# Patient Record
Sex: FEMALE | Race: WHITE | NOT HISPANIC OR LATINO | Employment: FULL TIME | URBAN - METROPOLITAN AREA
[De-identification: names, ages, dates, MRNs, and addresses within clinical notes are randomized per-mention and may not be internally consistent; named-entity substitution may affect disease eponyms.]

---

## 2024-02-14 ENCOUNTER — HOSPITAL ENCOUNTER (OUTPATIENT)
Dept: RADIOLOGY | Facility: CLINIC | Age: 59
Discharge: HOME | End: 2024-02-14
Payer: COMMERCIAL

## 2024-02-14 DIAGNOSIS — E78.2 MIXED HYPERLIPIDEMIA: ICD-10-CM

## 2024-02-14 PROCEDURE — 75571 CT HRT W/O DYE W/CA TEST: CPT

## 2024-09-04 ENCOUNTER — OFFICE VISIT (OUTPATIENT)
Dept: CARDIOLOGY | Facility: CLINIC | Age: 59
End: 2024-09-04
Payer: COMMERCIAL

## 2024-09-04 VITALS
HEIGHT: 60 IN | WEIGHT: 161 LBS | DIASTOLIC BLOOD PRESSURE: 82 MMHG | OXYGEN SATURATION: 97 % | SYSTOLIC BLOOD PRESSURE: 128 MMHG | BODY MASS INDEX: 31.61 KG/M2 | HEART RATE: 99 BPM

## 2024-09-04 DIAGNOSIS — R94.31 ABNORMAL ECG DURING EXERCISE STRESS TEST: Primary | ICD-10-CM

## 2024-09-04 DIAGNOSIS — E78.2 MIXED HYPERLIPIDEMIA: ICD-10-CM

## 2024-09-04 DIAGNOSIS — R93.1 ABNORMAL HEART SCORE CT: ICD-10-CM

## 2024-09-04 PROCEDURE — 93010 ELECTROCARDIOGRAM REPORT: CPT | Performed by: STUDENT IN AN ORGANIZED HEALTH CARE EDUCATION/TRAINING PROGRAM

## 2024-09-04 PROCEDURE — 1036F TOBACCO NON-USER: CPT

## 2024-09-04 PROCEDURE — 99203 OFFICE O/P NEW LOW 30 MIN: CPT

## 2024-09-04 PROCEDURE — 3008F BODY MASS INDEX DOCD: CPT

## 2024-09-04 PROCEDURE — 93005 ELECTROCARDIOGRAM TRACING: CPT

## 2024-09-04 PROCEDURE — 99213 OFFICE O/P EST LOW 20 MIN: CPT

## 2024-09-04 RX ORDER — BRIMONIDINE 5 MG/G
GEL TOPICAL
COMMUNITY
Start: 2024-06-13 | End: 2025-06-13

## 2024-09-04 RX ORDER — LACTOBACILLUS COMBINATION NO.4 3B CELL
CAPSULE ORAL
COMMUNITY

## 2024-09-04 RX ORDER — BUDESONIDE AND FORMOTEROL FUMARATE DIHYDRATE 80; 4.5 UG/1; UG/1
2 AEROSOL RESPIRATORY (INHALATION)
COMMUNITY
Start: 2024-04-25

## 2024-09-04 RX ORDER — SERTRALINE HYDROCHLORIDE 50 MG/1
TABLET, FILM COATED ORAL EVERY 24 HOURS
COMMUNITY

## 2024-09-04 RX ORDER — ATORVASTATIN CALCIUM 40 MG/1
40 TABLET, FILM COATED ORAL DAILY
COMMUNITY
Start: 2024-08-01

## 2024-09-04 RX ORDER — SULFACETAMIDE SODIUM, SULFUR 100; 50 MG/G; MG/G
EMULSION TOPICAL
COMMUNITY
Start: 2023-10-11 | End: 2024-10-08

## 2024-09-04 RX ORDER — ACETAMINOPHEN 160 MG
10 TABLET,CHEWABLE ORAL AS NEEDED
COMMUNITY

## 2024-09-04 RX ORDER — BUPROPION HYDROCHLORIDE 150 MG/1
150 TABLET ORAL EVERY MORNING
COMMUNITY
Start: 2023-08-14

## 2024-09-04 ASSESSMENT — ENCOUNTER SYMPTOMS: DYSPNEA ON EXERTION: 1

## 2024-09-04 NOTE — PROGRESS NOTES
Referred by Dr. 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     She reports that she has not been as active as she would like to be . Her biggest complaint is ALONZO with activity . We discussed her lipid panel that she had done at OSH. Patient denies chest pain and angina.  Pt denies orthopnea, and paroxysmal nocturnal dyspnea.  Pt denies worsening lower extremity edema.  Pt denies palpitations or syncope.  No recent falls.  No fever or chills.  No cough.  No change in bowel or bladder habits.  No sick contacts.  No recent travel.    Review of Systems   Cardiovascular:  Positive for dyspnea on exertion.   All other systems reviewed and are negative.    Past Medical History:  She has no past medical history on file.    Past Surgical History:  She has no past surgical history on file.      Social History:  Social drinking, denies smoking     Family History:  Paternal grandfather MI in 40's. Maternal grandfather angina.      Allergies:  Patient has no allergy information on record.    Outpatient Medications:  No current outpatient medications     Last Recorded Vitals:  There were no vitals filed for this visit.    Physical Exam  Constitutional:       Appearance: Normal appearance.   Neck:      Vascular: No carotid bruit.   Cardiovascular:      Rate and Rhythm: Normal rate and regular rhythm.      Heart sounds: No murmur heard.  Pulmonary:      Effort: Pulmonary effort is normal.      Breath sounds: Normal breath sounds.   Abdominal:      Palpations: Abdomen is soft.   Skin:     General: Skin is warm.   Neurological:      Mental Status: She is alert and oriented to person, place, and time.   Psychiatric:         Mood and Affect: Mood normal.  "      Last Labs:  CBC -  No results found for: \"WBC\", \"HGB\", \"HCT\", \"MCV\", \"PLT\"    CMP -  No results found for: \"CALCIUM\", \"PHOS\", \"PROT\", \"ALBUMIN\", \"AST\", \"ALT\", \"ALKPHOS\", \"BILITOT\"    LIPID PANEL -   No results found for: \"CHOL\", \"TRIG\", \"HDL\", \"CHHDL\", \"LDLF\", \"VLDL\", \"NHDL\"    RENAL FUNCTION PANEL -   No results found for: \"GLUCOSE\", \"NA\", \"K\", \"CL\", \"CO2\", \"ANIONGAP\", \"BUN\", \"CREATININE\", \"GFRMALE\", \"CALCIUM\", \"PHOS\", \"ALBUMIN\"     No results found for: \"BNP\", \"HGBA1C\"    Last Cardiology Tests:  ECG:  NSR     CT cardiac scoring wo IV contrast 02/14/2024  FINDINGS:  The score and distribution of calcium in the coronary arteries is as  follows:      LM 0,  .69,  LCx 40.18,  .48,      Total 507.35      The visualized mid/lower ascending thoracic aorta measures 3.5 cm in  diameter. The heart is normal in size. No pericardial effusion is  present.      No gross evidence of mediastinal or hilar lymphadenopathy or masses  is identified. The visualized segments of the lungs are normally  expanded.      The visualized subdiaphragmatic structures appear intact.Scoliosis.      IMPRESSION:  1. Coronary artery calcium score of 507*.  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 I personally reviewed the patients recent labs, medications, orders, EKGs, and pertinent cardiac imaging/ echocardiography and ischemic evaluations including stress testing.    She reports that she has not been as active as she would like to be . Her biggest complaint is ALONZO with activity . We discussed her lipid panel that she had done at OSH. Today she is normotensive . LDL in the 200's approximately 3 months ago prior to starting high intensity statin and aspirin 81 mg daily. Given her risk factors and recent " borderline abnormal stress test without imaging and cardiac CT score as well as symptoms will have her obtain a Nuclear Exercise Stress Test.    Patient to obtain fasting Lipid Panel and TSH with reflexive T4.     Follow up in 2-3 weeks virtually to discuss results.     Gemma Salazar, APRN-CNP

## 2024-09-04 NOTE — PATIENT INSTRUCTIONS
Nory,    Nuclear Exercise Stress Test. May take meds sips of water, avoid caffeine 12 hours before testing. [phone]    Please have TSH and Fasting lipid panel drawn    Follow up with a virtual visit 2 1/2    Gemma WONG-ТАТЬЯНА

## 2024-09-07 LAB
ATRIAL RATE: 85 BPM
P AXIS: 57 DEGREES
P OFFSET: 199 MS
P ONSET: 146 MS
PR INTERVAL: 138 MS
Q ONSET: 215 MS
QRS COUNT: 14 BEATS
QRS DURATION: 98 MS
QT INTERVAL: 388 MS
QTC CALCULATION(BAZETT): 461 MS
QTC FREDERICIA: 435 MS
R AXIS: 15 DEGREES
T AXIS: 66 DEGREES
T OFFSET: 409 MS
VENTRICULAR RATE: 85 BPM

## 2024-09-20 ENCOUNTER — HOSPITAL ENCOUNTER (OUTPATIENT)
Dept: RADIOLOGY | Facility: HOSPITAL | Age: 59
Discharge: HOME | End: 2024-09-20
Payer: COMMERCIAL

## 2024-09-20 ENCOUNTER — HOSPITAL ENCOUNTER (OUTPATIENT)
Dept: CARDIOLOGY | Facility: HOSPITAL | Age: 59
Discharge: HOME | End: 2024-09-20
Payer: COMMERCIAL

## 2024-09-20 DIAGNOSIS — R93.1 ABNORMAL HEART SCORE CT: ICD-10-CM

## 2024-09-20 DIAGNOSIS — R94.31 ABNORMAL ECG DURING EXERCISE STRESS TEST: ICD-10-CM

## 2024-09-20 PROCEDURE — A9502 TC99M TETROFOSMIN: HCPCS

## 2024-09-20 PROCEDURE — 3430000001 HC RX 343 DIAGNOSTIC RADIOPHARMACEUTICALS

## 2024-09-20 PROCEDURE — 93017 CV STRESS TEST TRACING ONLY: CPT

## 2024-09-20 PROCEDURE — 78452 HT MUSCLE IMAGE SPECT MULT: CPT

## 2024-09-20 PROCEDURE — 93016 CV STRESS TEST SUPVJ ONLY: CPT | Performed by: STUDENT IN AN ORGANIZED HEALTH CARE EDUCATION/TRAINING PROGRAM

## 2024-09-20 PROCEDURE — 93018 CV STRESS TEST I&R ONLY: CPT | Performed by: STUDENT IN AN ORGANIZED HEALTH CARE EDUCATION/TRAINING PROGRAM

## 2024-09-24 ENCOUNTER — TELEMEDICINE (OUTPATIENT)
Dept: CARDIOLOGY | Facility: CLINIC | Age: 59
End: 2024-09-24
Payer: COMMERCIAL

## 2024-09-24 DIAGNOSIS — R93.1 ABNORMAL HEART SCORE CT: ICD-10-CM

## 2024-09-24 DIAGNOSIS — R94.31 ABNORMAL ECG DURING EXERCISE STRESS TEST: ICD-10-CM

## 2024-09-24 DIAGNOSIS — E78.2 MIXED HYPERLIPIDEMIA: ICD-10-CM

## 2024-09-24 DIAGNOSIS — I25.10 CORONARY ARTERY DISEASE INVOLVING NATIVE HEART WITHOUT ANGINA PECTORIS, UNSPECIFIED VESSEL OR LESION TYPE: Primary | ICD-10-CM

## 2024-09-24 PROCEDURE — 99214 OFFICE O/P EST MOD 30 MIN: CPT

## 2024-09-24 NOTE — PROGRESS NOTES
Documentation:   Mode: Video  Consent: This visit was initiated by the patient. Audio and visual communication was utilized in real-time. I confirmed understanding of risks and benefits of telehealth visits and obtained consent to proceed with the telemedicine visit.  Location of Patient: Work  Time-Based Billing Justifications:   Charting in Epic  Patient visit (including performing a medically appropriate exam)  Obtaining history (or reviewing separately obtained history)  Counseling/educating the patient/family/caregiver  Ordering/interpreting (medications, tests, procedures)  Reviewing (chart, labs, and other clinical notes)     Current Patient Location: [place]  Chief Complaint:   FUV- Nuclear Stress Test.     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     Today we discussed Nuclear Stress Test. She reports that she has not had symptoms of chest pain or SOB. When she was on the treadmill for the stress test she reported that she felt she could exercise longer and did not feel significantly exerted .     Review of Systems   All other systems reviewed and are negative.    Past Medical History:  She has no past medical history on file.    Past Surgical History:  She has no past surgical history on file.      Social History:  She reports that she has never smoked. She has never used smokeless tobacco. She reports current alcohol use. She reports that she does not use drugs.    Family History:  No family history on file.     Allergies:  Sulfa (sulfonamide antibiotics)    Outpatient Medications:  Current Outpatient Medications   Medication Instructions    aspirin 81 mg capsule     atorvastatin (LIPITOR) 40 mg, oral, Daily    brimonidine 0.33 % gel with pump Topical, Daily RT     "budesonide-formoteroL (Symbicort) 80-4.5 mcg/actuation inhaler 2 puffs, inhalation, 2 times daily RT    buPROPion XL (WELLBUTRIN XL) 150 mg, oral, Every morning    calcium carbonate-vitamin D3 600 mg-5 mcg (200 unit) capsule 1 tablet with a meal Orally Once a day for 30 day(s)    ipratropium-albuteroL (Combivent Respimat)  mcg/actuation inhaler 1 puff, inhalation    lactobacillus combination no.4 (Probiotic) 3 billion cell capsule oral    loratadine (CLARITIN) 10 mg, oral, As needed    sertraline (Zoloft) 50 mg tablet Every 24 hours    sulfacetamide sodium-sulfur (Avar, Plexion) 10-5 % (w/w) topical emulsion Wash once or twice daily as needed       Physical Exam  Physical examination performed via telemedicine encounter:  General: awake, alert, non-diaphoretic, no psychomotor agitation, no acute distress.  Head: atraumatic, normocephalic, no rashes or lesions noted.  Eyes: no redness, discharge, swelling, or lesions.  Nose: no redness, swelling, discharge, deformity, or impetigo/crusting.  Skin: no lesions, wounds, erythema, or cyanosis noted on face or hands.  Cardiopulmonary: no increased respiratory effort, speaking in clear sentences, inspiratory to expiratory ratio within normal limits.  Musculoskeletal: normal range of motion of neck, upper extremities, and hands with no obvious synovitis.  Neurologic: cranial nerves grossly normal, speech normal rate and rhythm, moved both upper extremities equally.  Psychiatric: normal appearance, normal behavior, and normal attitude; well groomed, pleasant, cooperative.      Last Labs:  CBC -  No results found for: \"WBC\", \"HGB\", \"HCT\", \"MCV\", \"PLT\"    CMP -  No results found for: \"CALCIUM\", \"PHOS\", \"PROT\", \"ALBUMIN\", \"AST\", \"ALT\", \"ALKPHOS\", \"BILITOT\"    LIPID PANEL -   No results found for: \"CHOL\", \"TRIG\", \"HDL\", \"CHHDL\", \"LDLF\", \"VLDL\", \"NHDL\"    RENAL FUNCTION PANEL -   No results found for: \"GLUCOSE\", \"NA\", \"K\", \"CL\", \"CO2\", \"ANIONGAP\", \"BUN\", \"CREATININE\", " "\"GFRMALE\", \"CALCIUM\", \"PHOS\", \"ALBUMIN\"     No results found for: \"BNP\", \"HGBA1C\"    Last Cardiology Tests:  ECG:  ECG 12 lead (Clinic Performed) 09/04/2024      Stress Test:  Interpretation Summary  [facility], [address] [place] [address]               Tel [phone] and Fax [phone]     Nuclear Treadmill Stress Test     Patient Name:      RIGO MCKEON      Ordering Provider:     [id number] NUNU JOSEPH  Study Date:        9/20/2024           Reading Physician:     [id number] Jamin Suresh MD  MRN/PID:           [id number]            Supervising Physician:  Accession#:        [id number]        Fellow:  Date of Birth/Age: 1965 / 58     Fellow:                     years  Gender:            F                   Nurse:                 Jim Barreto                                                                RN  Admit Date:        9/20/2024           Technician:            Carmelita ROSS  Admission Status:  Outpatient          Sonographer:           HEATHER  Height:            152.40 cm           Technologist:  Weight:            73.03 kg            Additional Staff:  BSA:               1.70 m2             Encounter#:            [id number]  BMI:               31.44 kg/m2         Patient Location:      [facility] Non                                                                Invasive     Study Type:    CARDIOLOGY INTERPRETATION OF NUCLEAR STRESS  Diagnosis/ICD: Abnormal electrocardiogram [ECG] [EKG]-R94.31; Abnormal findings                 on diagnostic imaging of heart and coronary circulation-R93.1  Indication:    Dyspnea on Exertion and Abnormal CT score and exercise treadmill  CPT Code:      Stress Test Interpretation-04797; Stress Test Supervision-01421     Falls Risk: Low: Patient has a low risk for sustaining a fall; enviromental safety interventions in " place.     Study Details: Correct procedure and correct patient verified verbally and with                 ID Band checked.        Patient History: Hyperlipidemia. 58 year old female presents for a nuclear treadmill stress test for evaluation of ALONZO, abnormal CT score and exercise treadmill. PMH includes abnormal calcium score and abnormal stress test.  Allergies: Sulfa.  Smoker:    Never.  Diabetes:  No.        Medications: The patient's prescribed medication is Aspirin, Atorvastatin, Bupropion, Vitamin D3, Albuterol, Loratadine, Sertraline.. The patient took medications as prescribed.     Patient Performance: The patient exercised to stage III on a Antelmo protocol for 7 minutes, achieving 7.8 METS. The peak heart rate achieved was 150 bpm, which was 94 % of the age predicted target heart rate of 159 bpm. The resting blood pressure was 188/90 mmHg with a heart rate of 64 bpm. The standing blood pressure was 198/98 mmHg with a heart rate of 68 bpm. The patient developed fatigue during the stress exam. The symptoms resolved with rest 2 minutes into recovery. The blood pressure response was hypertensive. The test was terminated due to: fatigue, completed lab protocol and patient request. Patient has met the discharge criteria and is discharged to Nuclear Medicine.     Baseline ECG: Resting ECG showed normal sinus rhythm with normal tracing.     Stress ECG: Stress ECG showed sinus tachycardia, with borderline 1 to 2 mm upsloping inferior lateral ST depressions noted without symptoms in the setting of hypertensive urgency largely resolving about 1 minute into recovery with PAC's.     Stress Stage Data:  +-----------------+---+-----+-------+----+-------------------------------------+                   HR Sys  Fernandes BPMETSComments                                                   BP                                                      +-----------------+---+-----+-------+----+-------------------------------------+  Baseline Resting 64 188  90                                                +-----------------+---+-----+-------+----+-------------------------------------+  Baseline Vuzshggi31 198  98                                                +-----------------+---+-----+-------+----+-------------------------------------+  Baseline                            Denies c/o SOB or chest pain. SP02                                         98%.                                   +-----------------+---+-----+-------+----+-------------------------------------+  Stage I          769191  92         Denies c/o SOB or chest pain. SP02                                         98%.                                   +-----------------+---+-----+-------+----+-------------------------------------+  Stage II         425670  92         Denies c/o SOB or chest pain. SP02                                         98%.                                   +-----------------+---+-----+-------+----+-------------------------------------+  Stage III        150NA   NA     7.8 c/o fatigue, Sp02 97%.                 +-----------------+---+-----+-------+----+-------------------------------------+        Recovery ECG: Recovery ECG showed normal sinus rhythm, with PVC's. The heart rate recovery was normal.     +------------+---+------+-------+--------------------------+              HR Sys BPDias BPComments                    +------------+---+------+-------+--------------------------+  Recovery I  087931   90     Fatigue resolved. Sp02 97%  +------------+---+------+-------+--------------------------+  Recovery II 95 218   84     no symptoms, SP02 98%.      +------------+---+------+-------+--------------------------+  Recovery III77 180   74     no symptoms, SP02 98%.       +------------+---+------+-------+--------------------------+  Recovery IV 81 148   82     no symptoms, SP02 98%.      +------------+---+------+-------+--------------------------+        Summary   1. Adequate level of stress achieved.   2. The patient exercised to stage III on a Antelmo protocol for 7 minutes, achieving 7.8 METS. The peak heart rate achieved was 150 bpm, which was 94 % of the age predicted target heart rate.   3. Of note, the patient was severely hypertensive throughout stress testing with baseline blood pressure of 188/90 and peak blood pressure of 224/90 mmHg.   4. Abnormal stress ECG with borderline 1 to 2 mm upsloping inferior lateral ST depressions noted without symptoms in the setting of hypertensive urgency largely resolving about 1 minute into recovery. These changes may be related to hypertesnive response.   5. Findings discussed with ordering provider.   6. Nuclear image results are reported separately. Of note, no clear ischemia was noted on nuclear perfusion imaging.     [id number] Jamin Suresh MD  Electronically signed on 9/20/2024 at 12:04:45 PM        Nuclear Stress Test 09/20/2024  Imaging FINDINGS:  Stress and rest images both demonstrate a normal distribution of  perfusion throughout all LV segments with no sign of ischemia.  Moderate GI uptake is noted rest somewhat in close proximity  inferoseptal wall.      TID: 1.00      ECG-gated images demonstrate normal LV size with end-diastolic volume  80 mL and normal myocardial contractility with an LV ejection  fraction of  53 % (normal above 45 percent).      IMPRESSION:  1. Normal stress myocardial perfusion imaging.  2. Well-maintained left ventricular function.            Cardiac Imaging:  CT cardiac scoring wo IV contrast 02/14/2024  FINDINGS:  The score and distribution of calcium in the coronary arteries is as  follows:      LM 0,  .69,  LCx 40.18,  .48,      Total 507.35      The visualized mid/lower ascending  thoracic aorta measures 3.5 cm in  diameter. The heart is normal in size. No pericardial effusion is  present.      No gross evidence of mediastinal or hilar lymphadenopathy or masses  is identified. The visualized segments of the lungs are normally  expanded.      The visualized subdiaphragmatic structures appear intact.Scoliosis.      IMPRESSION:  1. Coronary artery calcium score of 507*.       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     Today we discussed Nuclear Stress Test. She reports that she has not had symptoms of chest pain or SOB. When she was on the treadmill for the stress test she reported that she felt she could exercise longer and did not feel significantly exerted . At peak exercise borderline 1 to 2 mm upsloping inferior lateral ST depressions noted without symptoms in the setting of hypertensive urgency largely resolving about 1 minute into recovery. These changes may be related to hypertesnive response. Nuclear Imaging with no clear ischemia. No further cardiac testing is needed at this time. However, if patient were to become symptomatic it was discussed that there would be a low threshold for a cardiac cath.     Will have her check BP at home twice daily . Once in the morning and once in the evening. She was advised to notify office if BP is above 140/90. Thus far average BP has been 120's/ 80's. She was also encouraged to increase activity to at least 150 minutes per week (minimum 30 minutes per day of moderate intensity exercise). She will be having Fasting Lipid Panel performed by PCP next month. She is to continue taking high intensity statin and aspirin 81 mg daily. The Mediterranean Diet was briefly discussed.     Patient to follow up in 6 month or sooner if she  has cardiac symptoms.     Gemma Salazar, APRN-CNP

## 2024-10-01 ENCOUNTER — APPOINTMENT (OUTPATIENT)
Dept: LAB | Facility: LAB | Age: 59
End: 2024-10-01
Payer: COMMERCIAL

## 2024-10-01 ENCOUNTER — LAB (OUTPATIENT)
Dept: LAB | Facility: LAB | Age: 59
End: 2024-10-01
Payer: COMMERCIAL

## 2024-10-01 DIAGNOSIS — R93.1 ABNORMAL HEART SCORE CT: ICD-10-CM

## 2024-10-01 DIAGNOSIS — F33.1 MAJOR DEPRESSIVE DISORDER, RECURRENT, MODERATE: ICD-10-CM

## 2024-10-01 DIAGNOSIS — R94.31 ABNORMAL ECG DURING EXERCISE STRESS TEST: ICD-10-CM

## 2024-10-01 DIAGNOSIS — J45.30 MILD PERSISTENT ASTHMA, UNCOMPLICATED (HHS-HCC): ICD-10-CM

## 2024-10-01 DIAGNOSIS — F33.1 MAJOR DEPRESSIVE DISORDER, RECURRENT, MODERATE: Primary | ICD-10-CM

## 2024-10-01 DIAGNOSIS — E78.2 MIXED HYPERLIPIDEMIA: ICD-10-CM

## 2024-10-01 LAB
ALBUMIN SERPL BCP-MCNC: 4.2 G/DL (ref 3.4–5)
ALP SERPL-CCNC: 86 U/L (ref 33–136)
ALT SERPL W P-5'-P-CCNC: 41 U/L (ref 7–45)
ANION GAP SERPL CALC-SCNC: 11 MMOL/L (ref 10–20)
AST SERPL W P-5'-P-CCNC: 25 U/L (ref 9–39)
BASOPHILS # BLD AUTO: 0.05 X10*3/UL (ref 0–0.1)
BASOPHILS NFR BLD AUTO: 0.9 %
BILIRUB SERPL-MCNC: 0.6 MG/DL (ref 0–1.2)
BUN SERPL-MCNC: 14 MG/DL (ref 6–23)
CALCIUM SERPL-MCNC: 9.7 MG/DL (ref 8.6–10.6)
CHLORIDE SERPL-SCNC: 105 MMOL/L (ref 98–107)
CHOLEST SERPL-MCNC: 154 MG/DL (ref 0–199)
CHOLESTEROL/HDL RATIO: 3.4
CO2 SERPL-SCNC: 29 MMOL/L (ref 21–32)
CREAT SERPL-MCNC: 0.79 MG/DL (ref 0.5–1.05)
EGFRCR SERPLBLD CKD-EPI 2021: 86 ML/MIN/1.73M*2
EOSINOPHIL # BLD AUTO: 0.21 X10*3/UL (ref 0–0.7)
EOSINOPHIL NFR BLD AUTO: 3.9 %
ERYTHROCYTE [DISTWIDTH] IN BLOOD BY AUTOMATED COUNT: 13.2 % (ref 11.5–14.5)
GLUCOSE SERPL-MCNC: 117 MG/DL (ref 74–99)
HCT VFR BLD AUTO: 40.2 % (ref 36–46)
HDLC SERPL-MCNC: 44.9 MG/DL
HGB BLD-MCNC: 13.8 G/DL (ref 12–16)
IMM GRANULOCYTES # BLD AUTO: 0.03 X10*3/UL (ref 0–0.7)
IMM GRANULOCYTES NFR BLD AUTO: 0.6 % (ref 0–0.9)
LDLC SERPL CALC-MCNC: 73 MG/DL
LDLC SERPL DIRECT ASSAY-MCNC: 83 MG/DL (ref 0–129)
LYMPHOCYTES # BLD AUTO: 1.5 X10*3/UL (ref 1.2–4.8)
LYMPHOCYTES NFR BLD AUTO: 27.6 %
MCH RBC QN AUTO: 29.6 PG (ref 26–34)
MCHC RBC AUTO-ENTMCNC: 34.3 G/DL (ref 32–36)
MCV RBC AUTO: 86 FL (ref 80–100)
MONOCYTES # BLD AUTO: 0.47 X10*3/UL (ref 0.1–1)
MONOCYTES NFR BLD AUTO: 8.7 %
NEUTROPHILS # BLD AUTO: 3.17 X10*3/UL (ref 1.2–7.7)
NEUTROPHILS NFR BLD AUTO: 58.3 %
NON HDL CHOLESTEROL: 109 MG/DL (ref 0–149)
NRBC BLD-RTO: 0 /100 WBCS (ref 0–0)
PLATELET # BLD AUTO: 316 X10*3/UL (ref 150–450)
POTASSIUM SERPL-SCNC: 4.7 MMOL/L (ref 3.5–5.3)
PROT SERPL-MCNC: 6.6 G/DL (ref 6.4–8.2)
RBC # BLD AUTO: 4.66 X10*6/UL (ref 4–5.2)
SODIUM SERPL-SCNC: 140 MMOL/L (ref 136–145)
TRIGL SERPL-MCNC: 182 MG/DL (ref 0–149)
TSH SERPL-ACNC: 2.14 MIU/L (ref 0.44–3.98)
VLDL: 36 MG/DL (ref 0–40)
WBC # BLD AUTO: 5.4 X10*3/UL (ref 4.4–11.3)

## 2024-10-01 PROCEDURE — 80053 COMPREHEN METABOLIC PANEL: CPT

## 2024-10-01 PROCEDURE — 84443 ASSAY THYROID STIM HORMONE: CPT

## 2024-10-01 PROCEDURE — 83721 ASSAY OF BLOOD LIPOPROTEIN: CPT

## 2024-10-01 PROCEDURE — 85025 COMPLETE CBC W/AUTO DIFF WBC: CPT

## 2024-10-01 PROCEDURE — 36415 COLL VENOUS BLD VENIPUNCTURE: CPT

## 2024-10-01 PROCEDURE — 80061 LIPID PANEL: CPT

## 2024-10-03 ENCOUNTER — APPOINTMENT (OUTPATIENT)
Dept: CARDIOLOGY | Facility: CLINIC | Age: 59
End: 2024-10-03
Payer: COMMERCIAL

## 2025-03-26 ENCOUNTER — OFFICE VISIT (OUTPATIENT)
Dept: CARDIOLOGY | Facility: CLINIC | Age: 60
End: 2025-03-26
Payer: COMMERCIAL

## 2025-03-26 VITALS
HEIGHT: 60 IN | BODY MASS INDEX: 31.02 KG/M2 | DIASTOLIC BLOOD PRESSURE: 88 MMHG | SYSTOLIC BLOOD PRESSURE: 122 MMHG | HEART RATE: 68 BPM | WEIGHT: 158 LBS | OXYGEN SATURATION: 98 %

## 2025-03-26 DIAGNOSIS — E78.2 MIXED HYPERLIPIDEMIA: ICD-10-CM

## 2025-03-26 DIAGNOSIS — I25.10 CORONARY ARTERY DISEASE INVOLVING NATIVE HEART WITHOUT ANGINA PECTORIS, UNSPECIFIED VESSEL OR LESION TYPE: ICD-10-CM

## 2025-03-26 DIAGNOSIS — R93.1 ABNORMAL HEART SCORE CT: Primary | ICD-10-CM

## 2025-03-26 PROCEDURE — 1036F TOBACCO NON-USER: CPT

## 2025-03-26 PROCEDURE — 99214 OFFICE O/P EST MOD 30 MIN: CPT

## 2025-03-26 PROCEDURE — 3008F BODY MASS INDEX DOCD: CPT

## 2025-03-26 RX ORDER — MONTELUKAST SODIUM 10 MG/1
10 TABLET ORAL NIGHTLY
COMMUNITY
Start: 2025-02-16

## 2025-03-26 ASSESSMENT — PAIN SCALES - GENERAL: PAINLEVEL_OUTOF10: 0-NO PAIN

## 2025-03-26 NOTE — PATIENT INSTRUCTIONS
Miss Doss,    No medication changes    Increase Activity 150/week (HR up to at least 125 bpm)    Goal LDL below 50    Follow up in 6 months    Gemma WONG-VANESA-C

## 2025-03-26 NOTE — PROGRESS NOTES
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 The changes may be r/t hypertensive response.  Nuclear Imaging which was negative for ischemia 9/20/2024.     She reports to feeling fairly well. We discussed Lipid goal and Exercise goals.  Patient denies chest pain and angina.  Pt denies shortness of breath, dyspnea on exertion, orthopnea, and paroxysmal nocturnal dyspnea.  Pt denies worsening lower extremity edema.  Pt denies palpitations or syncope.  No recent falls.  No fever or chills.  No cough.  No change in bowel or bladder habits.  No sick contacts.  No recent travel.    Review of Systems   All other systems reviewed and are negative.    Last Recorded Vitals:  Vitals:    03/26/25 0903   BP: 122/88   BP Location: Right arm   Patient Position: Sitting   Pulse: 68   SpO2: 98%   Weight: 71.7 kg (158 lb)   Height: 1.524 m (5')       Past Medical History:  She has no past medical history on file.    Past Surgical History:  She has no past surgical history on file.      Social History:  She reports that she has never smoked. She has never used smokeless tobacco. She reports current alcohol use. She reports that she does not use drugs.    Family History:  No family history on file.     Allergies:  Sulfa (sulfonamide antibiotics)    Outpatient Medications:  Current Outpatient Medications   Medication Instructions    aspirin 81 mg capsule     atorvastatin (LIPITOR) 40 mg, Daily    brimonidine 0.33 % gel with pump Daily RT    budesonide-formoteroL (Symbicort) 80-4.5 mcg/actuation inhaler 2 puffs, 2 times daily RT    buPROPion XL (WELLBUTRIN XL) 150 mg, Every  "morning    calcium carbonate-vitamin D3 600 mg-5 mcg (200 unit) capsule 1 tablet with a meal Orally Once a day for 30 day(s)    ipratropium-albuteroL (Combivent Respimat)  mcg/actuation inhaler 1 puff    lactobacillus combination no.4 (Probiotic) 3 billion cell capsule Take by mouth.    loratadine (CLARITIN) 10 mg, As needed    montelukast (SINGULAIR) 10 mg, Nightly    sertraline (Zoloft) 50 mg tablet Every 24 hours       Physical Exam  Constitutional:       Appearance: Normal appearance.   Neck:      Vascular: No carotid bruit.   Cardiovascular:      Rate and Rhythm: Normal rate and regular rhythm.      Heart sounds: No murmur heard.  Pulmonary:      Effort: Pulmonary effort is normal.      Breath sounds: Normal breath sounds.   Abdominal:      Palpations: Abdomen is soft.   Skin:     General: Skin is warm.   Neurological:      Mental Status: She is alert and oriented to person, place, and time.   Psychiatric:         Mood and Affect: Mood normal.       Last Labs:  CBC -  Lab Results   Component Value Date    WBC 5.4 10/01/2024    HGB 13.8 10/01/2024    HCT 40.2 10/01/2024    MCV 86 10/01/2024     10/01/2024       CMP -  Lab Results   Component Value Date    CALCIUM 9.7 10/01/2024    PROT 6.6 10/01/2024    ALBUMIN 4.2 10/01/2024    AST 25 10/01/2024    ALT 41 10/01/2024    ALKPHOS 86 10/01/2024    BILITOT 0.6 10/01/2024       LIPID PANEL -   Lab Results   Component Value Date    CHOL 154 10/01/2024    TRIG 182 (H) 10/01/2024    HDL 44.9 10/01/2024    CHHDL 3.4 10/01/2024    VLDL 36 10/01/2024    NHDL 109 10/01/2024       RENAL FUNCTION PANEL -   Lab Results   Component Value Date    GLUCOSE 117 (H) 10/01/2024     10/01/2024    K 4.7 10/01/2024     10/01/2024    CO2 29 10/01/2024    ANIONGAP 11 10/01/2024    BUN 14 10/01/2024    CREATININE 0.79 10/01/2024    CALCIUM 9.7 10/01/2024    ALBUMIN 4.2 10/01/2024        No results found for: \"BNP\", \"HGBA1C\"    Last Cardiology Tests:  ECG:  ECG 12 " lead (Clinic Performed) 09/04/2024       Stress Test:  Interpretation Summary  [facility], [address] [place] [address]               Tel [phone] and Fax [phone]     Nuclear Treadmill Stress Test     Patient Name:      RIGO MCKEON      Ordering Provider:     [id number] NUNUNISHANT JOSEPH  Study Date:        9/20/2024           Reading Physician:     [id number] Jamin Suresh MD  MRN/PID:           [id number]            Supervising Physician:  Accession#:        [id number]        Fellow:  Date of Birth/Age: 1965 / 58     Fellow:                     years  Gender:            F                   Nurse:                 Jim Barreto                                                                RN  Admit Date:        9/20/2024           Technician:            Carmelita ROSS  Admission Status:  Outpatient          Sonographer:           HEATHER  Height:            152.40 cm           Technologist:  Weight:            73.03 kg            Additional Staff:  BSA:               1.70 m2             Encounter#:            [id number]  BMI:               31.44 kg/m2         Patient Location:      [facility] Non                                                                Invasive     Study Type:    CARDIOLOGY INTERPRETATION OF NUCLEAR STRESS  Diagnosis/ICD: Abnormal electrocardiogram [ECG] [EKG]-R94.31; Abnormal findings                 on diagnostic imaging of heart and coronary circulation-R93.1  Indication:    Dyspnea on Exertion and Abnormal CT score and exercise treadmill  CPT Code:      Stress Test Interpretation-21675; Stress Test Supervision-26460     Falls Risk: Low: Patient has a low risk for sustaining a fall; enviromental safety interventions in place.     Study Details: Correct procedure and correct patient verified verbally and with                 ID Band  checked.        Patient History: Hyperlipidemia. 58 year old female presents for a nuclear treadmill stress test for evaluation of ALONZO, abnormal CT score and exercise treadmill. PMH includes abnormal calcium score and abnormal stress test.  Allergies: Sulfa.  Smoker:    Never.  Diabetes:  No.        Medications: The patient's prescribed medication is Aspirin, Atorvastatin, Bupropion, Vitamin D3, Albuterol, Loratadine, Sertraline.. The patient took medications as prescribed.     Patient Performance: The patient exercised to stage III on a Antelmo protocol for 7 minutes, achieving 7.8 METS. The peak heart rate achieved was 150 bpm, which was 94 % of the age predicted target heart rate of 159 bpm. The resting blood pressure was 188/90 mmHg with a heart rate of 64 bpm. The standing blood pressure was 198/98 mmHg with a heart rate of 68 bpm. The patient developed fatigue during the stress exam. The symptoms resolved with rest 2 minutes into recovery. The blood pressure response was hypertensive. The test was terminated due to: fatigue, completed lab protocol and patient request. Patient has met the discharge criteria and is discharged to Nuclear Medicine.     Baseline ECG: Resting ECG showed normal sinus rhythm with normal tracing.     Stress ECG: Stress ECG showed sinus tachycardia, with borderline 1 to 2 mm upsloping inferior lateral ST depressions noted without symptoms in the setting of hypertensive urgency largely resolving about 1 minute into recovery with PAC's.     Stress Stage Data:  +-----------------+---+-----+-------+----+-------------------------------------+                   HR Sys  Fernandes BPMETSComments                                                   BP                                                     +-----------------+---+-----+-------+----+-------------------------------------+  Baseline Resting 64 188  90                                                 +-----------------+---+-----+-------+----+-------------------------------------+  Baseline Exeprbju97 198  98                                                +-----------------+---+-----+-------+----+-------------------------------------+  Baseline                            Denies c/o SOB or chest pain. SP02                                         98%.                                   +-----------------+---+-----+-------+----+-------------------------------------+  Stage I          561906  92         Denies c/o SOB or chest pain. SP02                                         98%.                                   +-----------------+---+-----+-------+----+-------------------------------------+  Stage II         765601  92         Denies c/o SOB or chest pain. SP02                                         98%.                                   +-----------------+---+-----+-------+----+-------------------------------------+  Stage III        150NA   NA     7.8 c/o fatigue, Sp02 97%.                 +-----------------+---+-----+-------+----+-------------------------------------+        Recovery ECG: Recovery ECG showed normal sinus rhythm, with PVC's. The heart rate recovery was normal.     +------------+---+------+-------+--------------------------+              HR Sys BPDias BPComments                    +------------+---+------+-------+--------------------------+  Recovery I  089014   90     Fatigue resolved. Sp02 97%  +------------+---+------+-------+--------------------------+  Recovery II 95 218   84     no symptoms, SP02 98%.      +------------+---+------+-------+--------------------------+  Recovery III77 180   74     no symptoms, SP02 98%.      +------------+---+------+-------+--------------------------+  Recovery IV 81 148   82     no symptoms, SP02 98%.      +------------+---+------+-------+--------------------------+         Summary   1. Adequate level of stress achieved.   2. The patient exercised to stage III on a Antelmo protocol for 7 minutes, achieving 7.8 METS. The peak heart rate achieved was 150 bpm, which was 94 % of the age predicted target heart rate.   3. Of note, the patient was severely hypertensive throughout stress testing with baseline blood pressure of 188/90 and peak blood pressure of 224/90 mmHg.   4. Abnormal stress ECG with borderline 1 to 2 mm upsloping inferior lateral ST depressions noted without symptoms in the setting of hypertensive urgency largely resolving about 1 minute into recovery. These changes may be related to hypertesnive response.   5. Findings discussed with ordering provider.   6. Nuclear image results are reported separately. Of note, no clear ischemia was noted on nuclear perfusion imaging.     [id number] Jamin Suresh MD  Electronically signed on 9/20/2024 at 12:04:45 PM        Nuclear Stress Test 09/20/2024  Imaging FINDINGS:  Stress and rest images both demonstrate a normal distribution of  perfusion throughout all LV segments with no sign of ischemia.  Moderate GI uptake is noted rest somewhat in close proximity  inferoseptal wall.      TID: 1.00      ECG-gated images demonstrate normal LV size with end-diastolic volume  80 mL and normal myocardial contractility with an LV ejection  fraction of  53 % (normal above 45 percent).      IMPRESSION:  1. Normal stress myocardial perfusion imaging.  2. Well-maintained left ventricular function.      Cardiac Imaging:  CT cardiac scoring wo IV contrast 02/14/2024  FINDINGS:  The score and distribution of calcium in the coronary arteries is as  follows:      LM 0,  .69,  LCx 40.18,  .48,      Total 507.35      The visualized mid/lower ascending thoracic aorta measures 3.5 cm in  diameter. The heart is normal in size. No pericardial effusion is  present.      No gross evidence of mediastinal or hilar lymphadenopathy or masses  is identified.  The visualized segments of the lungs are normally  expanded.      The visualized subdiaphragmatic structures appear intact.Scoliosis.      IMPRESSION:  1. Coronary artery calcium score of 507*.     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 The changes may be r/t hypertensive response.  Nuclear Imaging which was negative for ischemia 9/20/2024.     She reports to feeling fairly well. We discussed Lipid goal and Exercise goals. BP is better controlled. She denies cardiac complaints.  Statin was increased to high intensity after last Lipid Panel. She will be having an upcoming Fasting Lipid Panel with PCP. LDL goal of less than 50 .    No medication changes at this time.    Follow up in 6 months or sooner is he has cardiac symptoms.       Gemma Salazar, MARVIN-CNP

## 2025-04-09 ENCOUNTER — APPOINTMENT (OUTPATIENT)
Dept: PRIMARY CARE | Facility: CLINIC | Age: 60
End: 2025-04-09
Payer: COMMERCIAL

## 2025-04-09 VITALS
BODY MASS INDEX: 29.83 KG/M2 | OXYGEN SATURATION: 98 % | HEIGHT: 61 IN | TEMPERATURE: 98 F | WEIGHT: 158 LBS | SYSTOLIC BLOOD PRESSURE: 128 MMHG | DIASTOLIC BLOOD PRESSURE: 80 MMHG | HEART RATE: 62 BPM

## 2025-04-09 DIAGNOSIS — F33.9 MAJOR DEPRESSION, RECURRENT, CHRONIC (CMS-HCC): Primary | ICD-10-CM

## 2025-04-09 DIAGNOSIS — E78.2 MIXED HYPERLIPIDEMIA: ICD-10-CM

## 2025-04-09 DIAGNOSIS — Z23 NEED FOR PNEUMOCOCCAL VACCINE: ICD-10-CM

## 2025-04-09 DIAGNOSIS — R73.03 BORDERLINE DIABETES: ICD-10-CM

## 2025-04-09 DIAGNOSIS — J45.40 MODERATE PERSISTENT ASTHMA WITHOUT COMPLICATION (HHS-HCC): ICD-10-CM

## 2025-04-09 PROCEDURE — 3008F BODY MASS INDEX DOCD: CPT | Performed by: FAMILY MEDICINE

## 2025-04-09 PROCEDURE — 99214 OFFICE O/P EST MOD 30 MIN: CPT | Performed by: FAMILY MEDICINE

## 2025-04-09 PROCEDURE — 90677 PCV20 VACCINE IM: CPT | Performed by: FAMILY MEDICINE

## 2025-04-09 PROCEDURE — 1036F TOBACCO NON-USER: CPT | Performed by: FAMILY MEDICINE

## 2025-04-09 PROCEDURE — 90471 IMMUNIZATION ADMIN: CPT | Performed by: FAMILY MEDICINE

## 2025-04-09 RX ORDER — MONTELUKAST SODIUM 10 MG/1
10 TABLET ORAL NIGHTLY
Qty: 90 TABLET | Refills: 1 | Status: SHIPPED | OUTPATIENT
Start: 2025-04-09 | End: 2025-10-06

## 2025-04-09 RX ORDER — MULTIVITAMIN/IRON/FOLIC ACID 18MG-0.4MG
TABLET ORAL
COMMUNITY

## 2025-04-09 RX ORDER — GLUC/MSM/COLGN2/HYAL/ANTIARTH3 375-375-20
1 TABLET ORAL DAILY
COMMUNITY

## 2025-04-09 RX ORDER — ALBUTEROL SULFATE 90 UG/1
2 INHALANT RESPIRATORY (INHALATION) EVERY 4 HOURS
COMMUNITY
Start: 2024-10-17 | End: 2025-04-09 | Stop reason: SDUPTHER

## 2025-04-09 RX ORDER — BUPROPION HYDROCHLORIDE 150 MG/1
150 TABLET ORAL EVERY MORNING
Qty: 90 TABLET | Refills: 1 | Status: SHIPPED | OUTPATIENT
Start: 2025-04-09 | End: 2025-10-06

## 2025-04-09 RX ORDER — SERTRALINE HYDROCHLORIDE 50 MG/1
50 TABLET, FILM COATED ORAL EVERY 24 HOURS
Qty: 90 TABLET | Refills: 1 | Status: SHIPPED | OUTPATIENT
Start: 2025-04-09 | End: 2025-10-06

## 2025-04-09 RX ORDER — DEXTROAMPHETAMINE SACCHARATE, AMPHETAMINE ASPARTATE MONOHYDRATE, DEXTROAMPHETAMINE SULFATE AND AMPHETAMINE SULFATE 3.75; 3.75; 3.75; 3.75 MG/1; MG/1; MG/1; MG/1
15 CAPSULE, EXTENDED RELEASE ORAL EVERY MORNING
COMMUNITY
Start: 2025-02-10

## 2025-04-09 RX ORDER — ATORVASTATIN CALCIUM 40 MG/1
40 TABLET, FILM COATED ORAL DAILY
Qty: 90 TABLET | Refills: 1 | Status: SHIPPED | OUTPATIENT
Start: 2025-04-09 | End: 2025-10-06

## 2025-04-09 RX ORDER — BUDESONIDE AND FORMOTEROL FUMARATE DIHYDRATE 80; 4.5 UG/1; UG/1
2 AEROSOL RESPIRATORY (INHALATION)
Qty: 30.6 G | Refills: 1 | Status: SHIPPED | OUTPATIENT
Start: 2025-04-09 | End: 2025-10-06

## 2025-04-09 RX ORDER — ALBUTEROL SULFATE 90 UG/1
2 INHALANT RESPIRATORY (INHALATION) EVERY 4 HOURS
Qty: 54 G | Refills: 1 | Status: SHIPPED | OUTPATIENT
Start: 2025-04-09 | End: 2025-10-06

## 2025-04-09 ASSESSMENT — ENCOUNTER SYMPTOMS
ABDOMINAL PAIN: 0
SHORTNESS OF BREATH: 0
CHILLS: 0
SLEEP DISTURBANCE: 0
LIGHT-HEADEDNESS: 0
ACTIVITY CHANGE: 0
DIZZINESS: 0
APPETITE CHANGE: 0
FEVER: 0

## 2025-04-09 ASSESSMENT — PATIENT HEALTH QUESTIONNAIRE - PHQ9
2. FEELING DOWN, DEPRESSED OR HOPELESS: NOT AT ALL
SUM OF ALL RESPONSES TO PHQ9 QUESTIONS 1 AND 2: 0
1. LITTLE INTEREST OR PLEASURE IN DOING THINGS: NOT AT ALL

## 2025-04-09 NOTE — PROGRESS NOTES
Subjective   Patient ID: Nory Doss is a 59 y.o. female who presents for New Patient Visit and Hyperlipidemia (Recheck. Review BW).    HPI   ADHD  Seeing a psychiatrist and currently on Adderall extended release 15 mg and only takes this 3x a wk     HPL   On atorvastatin and no side effects fr meds   Her last  LDL was 226, triglycerides 284 HDL 46 from lipid panel from June 2023 so she was started on pravastatin and last visit, she was switched to atorvastatin. She had a calcium score test from February 14, 2024 and the score was 507.35, she had an abnormal stress test 8/2024 and was referred to cardiology [facility]. she had nuclear stress test was performed and no intervention was required  Lab Results   Component Value Date    LDLCALC 73 10/01/2024     Depression   On Wellbutrin 150 mg, sertraline 50 mg    Asthma  On budesonide formoterol fumarate and compliant with this, montelukast    Labs from October 2, 2024 showed glucose 117, LDL 83, total cholesterol 154, HDL 44, CBC normal    she had an abnormal stress test 8/2024 and was referred to cardiology [facility]. she had nuclear stress test was performed and no intervention was required.     No visits with results within 30 Day(s) from this visit.   Latest known visit with results is:   Lab on 10/01/2024   Component Date Value Ref Range Status    Cholesterol 10/01/2024 154  0 - 199 mg/dL Final    HDL-Cholesterol 10/01/2024 44.9  mg/dL Final    Cholesterol/HDL Ratio 10/01/2024 3.4   Final    LDL Calculated 10/01/2024 73  <=99 mg/dL Final    VLDL 10/01/2024 36  0 - 40 mg/dL Final    Triglycerides 10/01/2024 182 (H)  0 - 149 mg/dL Final    Non HDL Cholesterol 10/01/2024 109  0 - 149 mg/dL Final    Thyroid Stimulating Hormone 10/01/2024 2.14  0.44 - 3.98 mIU/L Final    WBC 10/01/2024 5.4  4.4 - 11.3 x10*3/uL Final    nRBC 10/01/2024 0.0  0.0 - 0.0 /100 WBCs Final    RBC 10/01/2024 4.66  4.00 - 5.20 x10*6/uL Final    Hemoglobin 10/01/2024 13.8  12.0 - 16.0 g/dL  Final    Hematocrit 10/01/2024 40.2  36.0 - 46.0 % Final    MCV 10/01/2024 86  80 - 100 fL Final    MCH 10/01/2024 29.6  26.0 - 34.0 pg Final    MCHC 10/01/2024 34.3  32.0 - 36.0 g/dL Final    RDW 10/01/2024 13.2  11.5 - 14.5 % Final    Platelets 10/01/2024 316  150 - 450 x10*3/uL Final    Neutrophils % 10/01/2024 58.3  40.0 - 80.0 % Final    Immature Granulocytes %, Automated 10/01/2024 0.6  0.0 - 0.9 % Final    Lymphocytes % 10/01/2024 27.6  13.0 - 44.0 % Final    Monocytes % 10/01/2024 8.7  2.0 - 10.0 % Final    Eosinophils % 10/01/2024 3.9  0.0 - 6.0 % Final    Basophils % 10/01/2024 0.9  0.0 - 2.0 % Final    Neutrophils Absolute 10/01/2024 3.17  1.20 - 7.70 x10*3/uL Final    Immature Granulocytes Absolute, Au* 10/01/2024 0.03  0.00 - 0.70 x10*3/uL Final    Lymphocytes Absolute 10/01/2024 1.50  1.20 - 4.80 x10*3/uL Final    Monocytes Absolute 10/01/2024 0.47  0.10 - 1.00 x10*3/uL Final    Eosinophils Absolute 10/01/2024 0.21  0.00 - 0.70 x10*3/uL Final    Basophils Absolute 10/01/2024 0.05  0.00 - 0.10 x10*3/uL Final    Glucose 10/01/2024 117 (H)  74 - 99 mg/dL Final    Sodium 10/01/2024 140  136 - 145 mmol/L Final    Potassium 10/01/2024 4.7  3.5 - 5.3 mmol/L Final    Chloride 10/01/2024 105  98 - 107 mmol/L Final    Bicarbonate 10/01/2024 29  21 - 32 mmol/L Final    Anion Gap 10/01/2024 11  10 - 20 mmol/L Final    Urea Nitrogen 10/01/2024 14  6 - 23 mg/dL Final    Creatinine 10/01/2024 0.79  0.50 - 1.05 mg/dL Final    eGFR 10/01/2024 86  >60 mL/min/1.73m*2 Final    Calcium 10/01/2024 9.7  8.6 - 10.6 mg/dL Final    Albumin 10/01/2024 4.2  3.4 - 5.0 g/dL Final    Alkaline Phosphatase 10/01/2024 86  33 - 136 U/L Final    Total Protein 10/01/2024 6.6  6.4 - 8.2 g/dL Final    AST 10/01/2024 25  9 - 39 U/L Final    Bilirubin, Total 10/01/2024 0.6  0.0 - 1.2 mg/dL Final    ALT 10/01/2024 41  7 - 45 U/L Final    LDL, Direct 10/01/2024 83  0 - 129 mg/dL Final     Lab Results   Component Value Date    HGBA1C 5.8 (H)  04/04/2025     Review of Systems   Constitutional:  Negative for activity change, appetite change, chills and fever.   Eyes:  Negative for visual disturbance.   Respiratory:  Negative for shortness of breath.    Cardiovascular:  Negative for chest pain.   Gastrointestinal:  Negative for abdominal pain.   Skin:  Negative for rash.   Neurological:  Negative for dizziness and light-headedness.   Psychiatric/Behavioral:  Negative for sleep disturbance.        Objective   There were no vitals taken for this visit.    Physical Exam  Constitutional:       Appearance: Normal appearance.   Eyes:      Pupils: Pupils are equal, round, and reactive to light.   Cardiovascular:      Rate and Rhythm: Normal rate and regular rhythm.   Pulmonary:      Effort: Pulmonary effort is normal.      Breath sounds: Normal breath sounds.   Abdominal:      General: Abdomen is flat. Bowel sounds are normal.      Palpations: Abdomen is soft.   Musculoskeletal:         General: Normal range of motion.   Skin:     General: Skin is warm.   Neurological:      General: No focal deficit present.      Mental Status: She is alert.   Psychiatric:         Mood and Affect: Mood normal.       Assessment/Plan   Assessment & Plan  Major depression, recurrent, chronic (CMS-HCC)  Stable   Orders:    sertraline (Zoloft) 50 mg tablet; Take 1 tablet (50 mg) by mouth once every 24 hours.    buPROPion XL (Wellbutrin XL) 150 mg 24 hr tablet; Take 1 tablet (150 mg) by mouth once daily in the morning.    CBC and Auto Differential; Future    Comprehensive Metabolic Panel; Future    Mixed hyperlipidemia  Desirable range <100 mg/dL for primary prevention; <70 mg/dL for patients with CHD or diabetic patients with > or = 2 CHD risk factors.    Orders:    atorvastatin (Lipitor) 40 mg tablet; Take 1 tablet (40 mg) by mouth once daily.    CBC and Auto Differential; Future    Comprehensive Metabolic Panel; Future    Lipid Panel; Future    Moderate persistent asthma without  complication (HHS-HCC)  Stable   Orders:    montelukast (Singulair) 10 mg tablet; Take 1 tablet (10 mg) by mouth once daily at bedtime.    budesonide-formoterol (Symbicort) 80-4.5 mcg/actuation inhaler; Inhale 2 puffs 2 times a day.    albuterol 90 mcg/actuation inhaler; Inhale 2 puffs every 4 hours.    Need for pneumococcal vaccine    Orders:    Pneumococcal conjugate vaccine, 20-valent (PREVNAR 20)    Borderline diabetes    Orders:    Hemoglobin A1C; Future

## 2025-04-09 NOTE — ASSESSMENT & PLAN NOTE
Desirable range <100 mg/dL for primary prevention; <70 mg/dL for patients with CHD or diabetic patients with > or = 2 CHD risk factors.    Orders:    atorvastatin (Lipitor) 40 mg tablet; Take 1 tablet (40 mg) by mouth once daily.    CBC and Auto Differential; Future    Comprehensive Metabolic Panel; Future    Lipid Panel; Future

## 2025-10-02 ENCOUNTER — APPOINTMENT (OUTPATIENT)
Dept: CARDIOLOGY | Facility: CLINIC | Age: 60
End: 2025-10-02
Payer: COMMERCIAL

## 2025-10-10 ENCOUNTER — APPOINTMENT (OUTPATIENT)
Dept: PRIMARY CARE | Facility: CLINIC | Age: 60
End: 2025-10-10
Payer: COMMERCIAL

## 2025-10-23 ENCOUNTER — APPOINTMENT (OUTPATIENT)
Dept: PRIMARY CARE | Facility: CLINIC | Age: 60
End: 2025-10-23
Payer: COMMERCIAL